# Patient Record
Sex: MALE | Race: WHITE | NOT HISPANIC OR LATINO | Employment: FULL TIME | URBAN - METROPOLITAN AREA
[De-identification: names, ages, dates, MRNs, and addresses within clinical notes are randomized per-mention and may not be internally consistent; named-entity substitution may affect disease eponyms.]

---

## 2023-11-15 ENCOUNTER — OFFICE VISIT (OUTPATIENT)
Dept: ORTHOPEDIC SURGERY | Facility: CLINIC | Age: 68
End: 2023-11-15
Payer: COMMERCIAL

## 2023-11-15 VITALS — WEIGHT: 246 LBS | HEIGHT: 69 IN | BODY MASS INDEX: 36.43 KG/M2

## 2023-11-15 DIAGNOSIS — M25.562 ACUTE PAIN OF LEFT KNEE: ICD-10-CM

## 2023-11-15 DIAGNOSIS — G89.29 CHRONIC PAIN OF RIGHT KNEE: ICD-10-CM

## 2023-11-15 DIAGNOSIS — M17.11 PRIMARY OSTEOARTHRITIS OF RIGHT KNEE: Primary | ICD-10-CM

## 2023-11-15 DIAGNOSIS — M25.561 CHRONIC PAIN OF RIGHT KNEE: ICD-10-CM

## 2023-11-15 PROCEDURE — 1036F TOBACCO NON-USER: CPT | Performed by: FAMILY MEDICINE

## 2023-11-15 PROCEDURE — 1159F MED LIST DOCD IN RCRD: CPT | Performed by: FAMILY MEDICINE

## 2023-11-15 PROCEDURE — 1160F RVW MEDS BY RX/DR IN RCRD: CPT | Performed by: FAMILY MEDICINE

## 2023-11-15 PROCEDURE — 20611 DRAIN/INJ JOINT/BURSA W/US: CPT | Performed by: FAMILY MEDICINE

## 2023-11-15 PROCEDURE — 99213 OFFICE O/P EST LOW 20 MIN: CPT | Performed by: FAMILY MEDICINE

## 2023-11-15 RX ORDER — LIDOCAINE HYDROCHLORIDE 20 MG/ML
2 INJECTION, SOLUTION INFILTRATION; PERINEURAL
Status: COMPLETED | OUTPATIENT
Start: 2023-11-15 | End: 2023-11-15

## 2023-11-15 RX ORDER — ROSUVASTATIN CALCIUM 40 MG/1
TABLET, COATED ORAL
COMMUNITY
Start: 2019-03-05

## 2023-11-15 RX ORDER — TRIAMCINOLONE ACETONIDE 40 MG/ML
40 INJECTION, SUSPENSION INTRA-ARTICULAR; INTRAMUSCULAR
Status: COMPLETED | OUTPATIENT
Start: 2023-11-15 | End: 2023-11-15

## 2023-11-15 RX ADMIN — TRIAMCINOLONE ACETONIDE 40 MG: 40 INJECTION, SUSPENSION INTRA-ARTICULAR; INTRAMUSCULAR at 12:48

## 2023-11-15 RX ADMIN — LIDOCAINE HYDROCHLORIDE 2 ML: 20 INJECTION, SOLUTION INFILTRATION; PERINEURAL at 12:48

## 2023-11-15 NOTE — PROGRESS NOTES
History of Present Illness   Chief Complaint   Patient presents with    Right Knee - Follow-up       The patient is 67 y.o. male  here for follow-up of right knee pain.  Patient was last seen by me 6 months ago for some relatively acute atraumatic right knee pain.  X-ray showed some mild degenerative changes, pain was focal to the medial joint line, discussed further work-up and treatment at that time he was treated with a knee joint injection with Kenalog, he says this provided around 1 to 2 months of relief, says that he did follow back up with his PCP in July of this year, said the nurse practitioner to give him another injection which was not of any benefit.  He has been dealing with the pain since.  Patient works as a  at the [employer], does a lot of walking caring a heavy food tray up and down stairs, this exacerbates his symptoms, he has pain with prolonged standing.  Over the past few months he has noticed some more mild pain in the medial aspect of his left knee but is more concerned about worsening pain in his right knee.  He denies any swelling, no locking or catching or instability.    No past medical history on file.    Medication Documentation Review Audit       Reviewed by Radha Carreno LPN (Licensed Nurse) on 11/15/23 at 1118      Medication Order Taking? Sig Documenting Provider Last Dose Status   rosuvastatin (Crestor) 40 mg tablet [id number] Yes  Historical Provider, MD  Active                    Allergies   Allergen Reactions    Mold Unknown    Pollen Extracts Unknown       Social History     Socioeconomic History    Marital status:      Spouse name: Not on file    Number of children: Not on file    Years of education: Not on file    Highest education level: Not on file   Occupational History    Not on file   Tobacco Use    Smoking status: Never    Smokeless tobacco: Never   Substance and Sexual Activity    Alcohol use: Yes     Comment:  MODERATELY    Drug use: Never    Sexual activity: Not on file   Other Topics Concern    Not on file   Social History Narrative    Not on file     Social Determinants of Health     Financial Resource Strain: Not on file   Food Insecurity: Not on file   Transportation Needs: Not on file   Physical Activity: Not on file   Stress: Not on file   Social Connections: Not on file   Intimate Partner Violence: Not on file   Housing Stability: Not on file       No past surgical history on file.       Review of Systems   GENERAL: Negative  GI: Negative  MUSCULOSKELETAL: See HPI  SKIN: Negative  NEURO:  Negative     Physical Exam:    General/Constitutional: well appearing, no distress, appears stated age  HEENT: sclera clear  Respiratory: non labored breathing  Vascular: No edema, swelling or tenderness, except as noted in detailed exam.  Integumentary: No impressive skin lesions present, except as noted in detailed exam.  Neurological:  Alert and oriented   Psychological:  Normal mood and affect.  Musculoskeletal: Normal, except as noted in detailed exam and in HPI.  Mildly antalgic gait, unassisted      Right knee: Normal appearance, no swelling, no redness or warmth, no joint effusion is present. Focal tenderness to palpation at the medial joint line, no other areas of tenderness to palpation. Range of motion from 0 to 120 degrees with some discomfort at end range of flexion. No pain or weakness with resisted knee flexion or extension, discomfort medially with Zafar testing. Negative Lachman, negative posterior drawer. Stable to varus and valgus stress     Left knee: Normal appearance, no swelling, no skin changes, no emergency room, no joint effusion.  Range of motion from 0 to 130 degrees without pain.  Nontender to palpation at the medial joint line or any other areas of tenderness palpation.  No motor deficits or pain with strength testing, no gross ligamentous laxity     Imaging: No new imaging today    L Inj/Asp: R  knee on 11/15/2023 12:48 PM  Indications: pain  Details: 22 G needle, ultrasound-guided superolateral approach  Medications: 40 mg triamcinolone acetonide 40 mg/mL; 2 mL lidocaine 20 mg/mL (2 %)  Outcome: tolerated well, no immediate complications  Procedure, treatment alternatives, risks and benefits explained, specific risks discussed. Consent was given by the patient. Immediately prior to procedure a time out was called to verify the correct patient, procedure, equipment, support staff and site/side marked as required. Patient was prepped and draped in the usual sterile fashion.               Assessment   1. Chronic pain of right knee  Point of Care Ultrasound    MR knee right wo IV contrast      2. Acute pain of left knee          Right knee pain, symptoms thought to be secondary to possible mild osteoarthritis seen on x-ray versus consideration of medial meniscus tear, short-term response to corticosteroid injection 6 months ago.  Left knee pain more likely compensatory in nature, may also be some aggravation of some mild underlying osteoarthritis seen on prior bilateral AP knee x-rays    Plan: Discussed further work-up and treatment.  I would like to obtain an MRI of his right knee for better evaluation of medial meniscus to determine if he  may benefit from any arthroscopic intervention given short-term response to prior corticosteroid injection.  We did proceed with repeat Kenalog injection today to hopefully help him get the next few months of his work.  I will reach out to patient with MRI results, further treatment pending MRI results.

## 2023-11-27 ENCOUNTER — HOSPITAL ENCOUNTER (OUTPATIENT)
Dept: RADIOLOGY | Facility: CLINIC | Age: 68
Discharge: HOME | End: 2023-11-27
Payer: COMMERCIAL

## 2023-11-27 DIAGNOSIS — M25.561 CHRONIC PAIN OF RIGHT KNEE: ICD-10-CM

## 2023-11-27 DIAGNOSIS — G89.29 CHRONIC PAIN OF RIGHT KNEE: ICD-10-CM

## 2023-11-27 PROCEDURE — 73721 MRI JNT OF LWR EXTRE W/O DYE: CPT | Mod: RIGHT SIDE | Performed by: RADIOLOGY

## 2023-11-27 PROCEDURE — 73721 MRI JNT OF LWR EXTRE W/O DYE: CPT | Mod: RT

## 2023-12-12 ENCOUNTER — TELEPHONE (OUTPATIENT)
Dept: ORTHOPEDIC SURGERY | Facility: CLINIC | Age: 68
End: 2023-12-12
Payer: COMMERCIAL

## 2023-12-12 NOTE — TELEPHONE ENCOUNTER
PT IS REQUESTING A RESTRICTION NOT FOR WORK. STATING HE CAN NOT CLIMB STAIRS. HE WOULD LIKE TO SPEAK TO YOU ABOUT THIS.

## 2024-05-01 ENCOUNTER — APPOINTMENT (OUTPATIENT)
Dept: ORTHOPEDIC SURGERY | Facility: CLINIC | Age: 69
End: 2024-05-01
Payer: COMMERCIAL

## 2024-05-15 ENCOUNTER — OFFICE VISIT (OUTPATIENT)
Dept: ORTHOPEDIC SURGERY | Facility: CLINIC | Age: 69
End: 2024-05-15
Payer: COMMERCIAL

## 2024-05-15 ENCOUNTER — HOSPITAL ENCOUNTER (OUTPATIENT)
Dept: RADIOLOGY | Facility: CLINIC | Age: 69
Discharge: HOME | End: 2024-05-15
Payer: COMMERCIAL

## 2024-05-15 VITALS — HEIGHT: 68 IN | WEIGHT: 240 LBS | BODY MASS INDEX: 36.37 KG/M2

## 2024-05-15 DIAGNOSIS — M17.11 PRIMARY OSTEOARTHRITIS OF RIGHT KNEE: Primary | ICD-10-CM

## 2024-05-15 DIAGNOSIS — M25.561 CHRONIC PAIN OF RIGHT KNEE: ICD-10-CM

## 2024-05-15 DIAGNOSIS — M17.12 PRIMARY OSTEOARTHRITIS OF LEFT KNEE: ICD-10-CM

## 2024-05-15 DIAGNOSIS — G89.29 CHRONIC PAIN OF RIGHT KNEE: ICD-10-CM

## 2024-05-15 DIAGNOSIS — M25.562 LEFT KNEE PAIN: ICD-10-CM

## 2024-05-15 DIAGNOSIS — M17.11 PRIMARY OSTEOARTHRITIS OF RIGHT KNEE: ICD-10-CM

## 2024-05-15 PROCEDURE — 1036F TOBACCO NON-USER: CPT | Performed by: FAMILY MEDICINE

## 2024-05-15 PROCEDURE — 73562 X-RAY EXAM OF KNEE 3: CPT | Mod: LEFT SIDE | Performed by: RADIOLOGY

## 2024-05-15 PROCEDURE — 1160F RVW MEDS BY RX/DR IN RCRD: CPT | Performed by: FAMILY MEDICINE

## 2024-05-15 PROCEDURE — 99214 OFFICE O/P EST MOD 30 MIN: CPT | Performed by: FAMILY MEDICINE

## 2024-05-15 PROCEDURE — 73562 X-RAY EXAM OF KNEE 3: CPT | Mod: LT

## 2024-05-15 PROCEDURE — 1159F MED LIST DOCD IN RCRD: CPT | Performed by: FAMILY MEDICINE

## 2024-05-15 NOTE — PROGRESS NOTES
History of Present Illness   Chief Complaint   Patient presents with    Right Knee - Follow-up    Left Knee - Pain     X5 MONTHS       The patient is 68 y.o. male  here for follow-up of right knee pain as well as to discuss some more mild left knee pain.  Regards to his right knee patient has been doing some chronic right knee pain greater than 1 year, he did have initial mild degenerative changes seen on x-ray, had good but short-term response to corticosteroid injection by me last summer, had repeat injection by his PCP with minimal relief.  At her most recent visit in November of last year we did order an MRI given more minimal degenerative changes seen on x-ray, he is also treated with 1/3 injection, he says this was a little to no benefit.  MRI showed more advanced degenerative changes, we discussed over the phone and he wanted to weigh his options with regards to treatment and including gel injections versus surgical intervention.  He has been dealing with pain over the past 5 to 6 months.  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  Symptoms are exacerbated by this.  Over the past 5 months he has noticed some more mild discomfort in his left knee medially, does not feel as bad as his right knee but thought he would have it checked out today.  He denies any locking or catching of either knee, no swelling or instability, symptoms are typically worse with pivoting, twisting motions of the knee.  Of note patient was recently diagnosed with new onset neuropathy in bilateral feet, he is not diabetic, he was started on some gabapentin by his PCP, he is looking to get the opinion from neurologist with regards to his symptoms, says that he is anxious about pursuing knee replacement surgery while dealing with neuropathy.        No past medical history on file.    Medication Documentation Review Audit       Reviewed by Alfonzo Cantrell  MD (Physician) on 11/15/23 at 1251      Medication Order Taking? Sig Documenting Provider Last Dose Status   rosuvastatin (Crestor) 40 mg tablet [id number] Yes  Historical Provider, MD  Active                    Allergies   Allergen Reactions    Mold Unknown    Pollen Extracts Unknown       Social History     Socioeconomic History    Marital status:      Spouse name: Not on file    Number of children: Not on file    Years of education: Not on file    Highest education level: Not on file   Occupational History    Not on file   Tobacco Use    Smoking status: Never    Smokeless tobacco: Never   Substance and Sexual Activity    Alcohol use: Yes     Comment: MODERATELY    Drug use: Never    Sexual activity: Not on file   Other Topics Concern    Not on file   Social History Narrative    Not on file     Social Determinants of Health     Financial Resource Strain: Not on file   Food Insecurity: Not on file   Transportation Needs: Not on file   Physical Activity: Not on file   Stress: Not on file   Social Connections: Not on file   Intimate Partner Violence: Not on file   Housing Stability: Not on file       No past surgical history on file.       Review of Systems   GENERAL: Negative  GI: Negative  MUSCULOSKELETAL: See HPI  SKIN: Negative  NEURO:  Negative     Physical Exam:    General/Constitutional: well appearing, no distress, appears stated age  HEENT: sclera clear  Respiratory: non labored breathing  Vascular: No edema, swelling or tenderness, except as noted in detailed exam.  Integumentary: No impressive skin lesions present, except as noted in detailed exam.  Neurological:  Alert and oriented   Psychological:  Normal mood and affect.  Musculoskeletal: Normal, except as noted in detailed exam and in HPI.  Mildly antalgic gait, unassisted      Right knee: Normal appearance, no swelling, no redness or warmth, no joint effusion is present. Focal tenderness to palpation at the medial joint line, no other areas of  tenderness to palpation. Range of motion from 0 to 120 degrees with some discomfort at end range of flexion. No pain or weakness with resisted knee flexion or extension, discomfort medially with Zafar testing. Negative Lachman, negative posterior drawer. Stable to varus and valgus stress     Left knee: Normal appearance, no swelling, no skin changes, no emergency room, no joint effusion.  Range of motion from 0 to 130 degrees without pain.  Mild medial joint tenderness to palpation.  No motor deficits or pain with strength testing, no gross ligamentous laxity     Imaging: X-rays of left knee including bilateral AP weightbearing view obtained today and independently reviewed, left knee shows very mild degenerative changes with some mild medial joint space narrowing, single AP view of the right knee shows progression of degenerative changes now with advanced medial compartment narrowing compared to prior x-rays which is more consistent with MRI findings.        Assessment   1. Primary osteoarthritis of right knee        2. Primary osteoarthritis of left knee        3. Chronic pain of right knee          Plan: Discussed diagnosis, reviewed x-rays, further workup and treatment with patient.  With regards to his right knee patient states he is not ready to pursue knee replacement surgery, he has had decreasing, minimal response to recent corticosteroid injections.  He was interested in pursuing gel injections, we will submit authorization to his insurance for this    With regards to his left knee he says symptoms are pretty mild, tolerable, says that he plans to continue with conservative managements, he was not interested in any corticosteroid injection today, he will continue with Tylenol as needed and icing.  He will follow-up as symptoms dictate for this.

## 2024-05-22 RX ORDER — HYALURONATE SODIUM 30 MG/2 ML
30 SYRINGE (ML) INTRAARTICULAR
Qty: 6 ML | Refills: 0 | Status: SHIPPED | OUTPATIENT
Start: 2024-05-26 | End: 2024-06-10

## 2025-01-10 ENCOUNTER — OFFICE VISIT (OUTPATIENT)
Dept: URGENT CARE | Age: 70
End: 2025-01-10
Payer: COMMERCIAL

## 2025-01-10 VITALS
HEIGHT: 68 IN | SYSTOLIC BLOOD PRESSURE: 122 MMHG | WEIGHT: 230 LBS | RESPIRATION RATE: 18 BRPM | TEMPERATURE: 98.4 F | HEART RATE: 62 BPM | DIASTOLIC BLOOD PRESSURE: 60 MMHG | OXYGEN SATURATION: 97 % | BODY MASS INDEX: 34.86 KG/M2

## 2025-01-10 DIAGNOSIS — J02.9 SORE THROAT: Primary | ICD-10-CM

## 2025-01-10 LAB — POC RAPID STREP: NEGATIVE

## 2025-01-10 PROCEDURE — 87651 STREP A DNA AMP PROBE: CPT

## 2025-01-10 RX ORDER — METOPROLOL SUCCINATE 200 MG/1
200 TABLET, EXTENDED RELEASE ORAL DAILY
COMMUNITY

## 2025-01-10 RX ORDER — LOSARTAN POTASSIUM 25 MG/1
25 TABLET ORAL DAILY
COMMUNITY

## 2025-01-10 RX ORDER — GABAPENTIN 100 MG/1
100 CAPSULE ORAL 3 TIMES DAILY
COMMUNITY

## 2025-01-10 RX ORDER — EZETIMIBE 10 MG/1
10 TABLET ORAL DAILY
COMMUNITY

## 2025-01-10 ASSESSMENT — ENCOUNTER SYMPTOMS
TROUBLE SWALLOWING: 0
COUGH: 0
FEVER: 0
VOMITING: 0
SORE THROAT: 1
SHORTNESS OF BREATH: 0

## 2025-01-10 NOTE — PROGRESS NOTES
"Subjective   Patient ID: Drew Vines is a 69 y.o. male. They present today with a chief complaint of Sore Throat (X 3 days, swollen glands. Worse in morning.).    HISTORY OF PRESENT ILLNESS:    Patient presents to clinic for sore throat and pain with swallowing x 3 days. States pain is at its worst in the morning upon waking. Patient denies confirmed sick contacts but states [place] in a [place] recently. Denies fevers or dyspnea.     Past Medical History  Allergies as of 01/10/2025 - Reviewed 01/10/2025   Allergen Reaction Noted    Mold Unknown 09/02/2008    Pollen extracts Unknown 11/05/2007       Current Outpatient Medications   Medication Instructions    BABY ASPIRIN ORAL oral    ezetimibe (ZETIA) 10 mg, oral, Daily    gabapentin (NEURONTIN) 100 mg, oral, 3 times daily    losartan (COZAAR) 25 mg, oral, Daily    metoprolol succinate XL (TOPROL-XL) 200 mg, oral, Daily, Do not crush or chew.    rosuvastatin (Crestor) 40 mg tablet          No past medical history on file.    No past surgical history on file.     reports that he has never smoked. He has never used smokeless tobacco. He reports current alcohol use. He reports that he does not use drugs.    Review of Systems    Review of Systems   Constitutional:  Negative for fever.   HENT:  Positive for sore throat. Negative for drooling and trouble swallowing.    Respiratory:  Negative for cough and shortness of breath.    Cardiovascular:  Negative for chest pain.   Gastrointestinal:  Negative for vomiting.   Skin:  Negative for rash.                                 Objective    Vitals:    01/10/25 1009   BP: 122/60   BP Location: Right arm   Patient Position: Sitting   BP Cuff Size: Large adult   Pulse: 62   Resp: 18   Temp: 36.9 °C (98.4 °F)   TempSrc: Oral   SpO2: 97%   Weight: 104 kg (230 lb)   Height: 1.727 m (5' 8\")     No LMP for male patient.  PHYSICAL EXAMINATION:    Physical Exam  Vitals and nursing note reviewed.   Constitutional:       General: He is " not in acute distress.     Appearance: Normal appearance. He is not ill-appearing.   HENT:      Head: Normocephalic and atraumatic.      Right Ear: Tympanic membrane, ear canal and external ear normal.      Left Ear: Tympanic membrane, ear canal and external ear normal.      Nose: Nose normal.      Mouth/Throat:      Mouth: Mucous membranes are moist.      Pharynx: Oropharynx is clear. Uvula midline. Postnasal drip present. No oropharyngeal exudate, posterior oropharyngeal erythema or uvula swelling.   Eyes:      General:         Right eye: No discharge.         Left eye: No discharge.      Extraocular Movements: Extraocular movements intact.      Conjunctiva/sclera: Conjunctivae normal.   Cardiovascular:      Rate and Rhythm: Normal rate and regular rhythm.      Heart sounds: Murmur heard.   Pulmonary:      Effort: Pulmonary effort is normal. No respiratory distress.      Breath sounds: Normal breath sounds. No stridor.   Musculoskeletal:      Cervical back: Normal range of motion and neck supple.   Lymphadenopathy:      Cervical: No cervical adenopathy.   Skin:     General: Skin is warm and dry.      Findings: No rash.   Neurological:      General: No focal deficit present.      Mental Status: He is alert and oriented to person, place, and time.   Psychiatric:         Mood and Affect: Mood normal.         Behavior: Behavior normal.        Procedures    Results for orders placed or performed in visit on 01/10/25   POCT rapid strep A manually resulted   Result Value Ref Range    POC Rapid Strep Negative Negative     Diagnostic study results (if any) were reviewed by Haily Hernandez PA-C.    Assessment/Plan   Allergies, medications, history, and pertinent labs/EKGs/Imaging reviewed by Haily Hernandez PA-C.     Orders and Diagnoses  Diagnoses and all orders for this visit:  Sore throat  -     POCT rapid strep A manually resulted  -     Group A Streptococcus, PCR      Medical Admin Record    Given overall well  appearance, vital signs, history, and exam as above, there is no indication for further emergent testing/intervention at this time.      I discussed with the patient my clinical thoughts at this time given the above and we had a shared decision-making conversation in a patient-centered decision-making model on how to proceed forward. Pt was instructed on the importance of close follow-up. They were told that an urgent care diagnosis is often a preliminary impression and that definitive care is often not able to be given in the urgent care setting. Pt was educated that close follow-up is essential for good health and good outcomes. Patient was provided with the following instructions:         Await strep PCR.       Cool mist humidifier in room at night while sleeping.    Tea with honey, throat lozenges, salt water gargles.     Tylenol for fevers/pain if needed.      Plenty of fluids and rest.      Good hand hygiene.      Follow up with PCP or RTC in the next 7 days if sx fail to show adequate improvement.        Clinical impression as well as limitations of available testing/examination, all discussed with patient. Pt is well at this time in the urgent care. They are comfortable with the present assessment and plan to be discharged home. Discussed with them close outpatient follow up, reassessment, and possible further testing/treatment via their PCP/specialist if symptoms fail to improve; they agree, understand, and are comfortable with this plan. Pt given the opportunity to ask questions prior to discharge and all questions were answered at this time. Via our discussion, the patient was advised of warning signs and instructions were reviewed. Strict ED precautions were given, acknowledged, and understood. Discussed with the patient/family that it is okay to return to the urgent care at any time for anything. Advised to present to the ED if present condition changes/worsens or if they develop new symptoms at any  time after discharge. Also, advised to go to the ED if they cannot establish outpatient follow-up in time frame specified above. Pt verbalized understanding and agreement with plan and instructions. Discussed the need for close follow up with their primary care provider and/or specialist for further testing/treatment/care/consultation in the short time frame as noted above, if needed - they understand these instructions and agree to close follow up for these reasons. Patient discharged home in stable condition.      Follow Up Instructions  No follow-ups on file.    Patient disposition: Home    Electronically signed by Haily Hernandez PA-C  10:52 AM

## 2025-01-11 LAB — S PYO DNA THROAT QL NAA+PROBE: NOT DETECTED
